# Patient Record
Sex: FEMALE | URBAN - METROPOLITAN AREA
[De-identification: names, ages, dates, MRNs, and addresses within clinical notes are randomized per-mention and may not be internally consistent; named-entity substitution may affect disease eponyms.]

---

## 2024-03-14 ENCOUNTER — APPOINTMENT (RX ONLY)
Dept: URBAN - METROPOLITAN AREA CLINIC 333 | Facility: CLINIC | Age: 29
Setting detail: DERMATOLOGY
End: 2024-03-14

## 2024-03-14 DIAGNOSIS — Z41.9 ENCOUNTER FOR PROCEDURE FOR PURPOSES OTHER THAN REMEDYING HEALTH STATE, UNSPECIFIED: ICD-10-CM

## 2024-03-14 PROCEDURE — ? VI PEEL

## 2024-03-14 NOTE — PROCEDURE: VI PEEL
Comments: Recommended to do a series of 3 peels to give the best benefits.\\nRecommended to continue to use sunscreen along with her fading cream after peeling has completely subsided.
Detail Level: Zone
Prep: The treated area was cleansed and degreased with pre-peel prep.
Post-Care Instructions: Day One: Do not put anything on your skin for 4 hours (including sunscreen). \\n 4 hours after VI Peel (At _____________), apply Post Treatment Repair (in the post peel kit) over the peeled areas. \\nApply the SPF 50 over the Post Treatment Repair.\\n\\nNight One: 1 hour before bedtime\\n    Step 1: Cleanse skin with half of the  Vi Derm Gentle Cleanser. Avoid hot water. Gently pat skin dry.\\n    Step 2: Apply Precision Plus Towelette (White Towelette) to all areas of the skin. Use firm pressure.  DO NOT WASH OFF\\n    Step 3: Wait 30 minutes then apply Post Peel Towelette (Green Towelette) to all areas of the skin. Use firm pressure. DO NOT WASH OFF\\n    Step 4: Wait 10 min and then apply a thin layer of the Post Treatment Repair. \\n    Step 5: You are now ready for bed. Sweet Dreams!\\n      \\nDay Two: \\n    Step 1: Cleanse your skin with the other half of the Vi Derm Gentle Cleanser. Avoid hot water. Gently pat skin dry.\\n    Step 2: Apply a thin layer of Post Treatment Repair. Reapply throughout the day. (late morning, lunch and mid evening)\\n    Step 3: Apply Vi Derm SPF 50+ sunscreen. Reapply every couple of hours. Even if it is not michael outside!                                                                                                                                                  \\n\\nNight Two: 1 hour before bedtime\\n    Step 1: Cleanse your skin with half of the Vi Derm Gentle cleanser. Avoid hot water. Gently pat skin dry.\\n    Step 2:  Apply Precision Peel Towelette (White Towelette) to all areas of skin. DO NOT WASH OFF\\n    Step 3: Wait 30 minutes, then apply the Post Peel Towelette (Green Towelette).  DO NOT WASH OFF\\n    Step 4: Wait 10 min and then apply a thin layer of the Post Treatment Repair. \\n    Step 5: You are now ready for bed. Sweet Dreams!\\n        \\nDay Three:\\n    Today is when the peeling phase starts usually around the mouth area peeling outwards.\\n     Step 1: Cleanse with half of the Vi Derm gentle cleanser.\\n     Step 2: Apply thin layer of post treatment repair at least 3 times per day , more often if needed.\\n     Step 3: Apply  your Vi Derm SPF 50 and try to avoid the sun during the peeling phase. Reapply every 2 hours.\\n\\nDay Four - Seven\\n     You will wash your face morning and night with a gentle cleanser.\\n     You will continue using the Post Treatment Repair until you run out or until you come back to see me for your next appointment.\\n     Remember to wear Vi Derm SPF50 every day and you can reapply with our SPF Brush.\\n     You can wear make-up throughout the peeling process if you desire.\\n     After all peeling is complete you may resume your normal skin care regimen.\\n\\nPlease note the following:\\n· Never peel or force the skin off, you will scar.  Your skin will naturally slough off when washed.  You may take small scissors and trim the peeling skin.  Please be careful when doing so.\\n· If at anytime the Post Treatment Repair or sunscreen or a combination of both irritate your skin stop using them immediately and call our office at [phone].
Price (Use Numbers Only, No Special Characters Or $): 389
Post Peel Care: After the procedure, the patient was instructed not to apply anything to the skin for 4 hours. Written post op instructions given to pt to follow throughout the healing process. Pt to use Post Tx Repair Cream 4 hours post procedure, then BID and PRN throughout the day. Pt counseled on importance of SPF reapplication. Pt to resume normal skin care regimen in 7-10 days.
Treatment Number: 1
Consent: Prior to the procedure, written consent was obtained and risks were reviewed, including but not limited to: redness, peeling, blistering, pigmentary change, scarring, infection, and pain. Patient is aware multiple treatments may be necessary to achieve the desired outcome.
Chemical Peel: VI Peel Precision Plus

## 2024-04-11 ENCOUNTER — APPOINTMENT (RX ONLY)
Dept: URBAN - METROPOLITAN AREA CLINIC 333 | Facility: CLINIC | Age: 29
Setting detail: DERMATOLOGY
End: 2024-04-11

## 2024-04-11 DIAGNOSIS — Z41.9 ENCOUNTER FOR PROCEDURE FOR PURPOSES OTHER THAN REMEDYING HEALTH STATE, UNSPECIFIED: ICD-10-CM

## 2024-04-11 PROCEDURE — ? VI PEEL

## 2024-04-11 NOTE — PROCEDURE: VI PEEL
Consent: Prior to the procedure, written consent was obtained and risks were reviewed, including but not limited to: redness, peeling, blistering, pigmentary change, scarring, infection, and pain. Patient is aware multiple treatments may be necessary to achieve the desired outcome.
Price (Use Numbers Only, No Special Characters Or $): 389
Post-Care Instructions: Day One: Do not put anything on your skin for 4 hours (including sunscreen). \\n 4 hours after VI Peel (At _____________), apply Post Treatment Repair (in the post peel kit) over the peeled areas. \\nApply the SPF 50 over the Post Treatment Repair.\\n\\nNight One: 1 hour before bedtime\\n    Step 1: Cleanse skin with half of the  Vi Derm Gentle Cleanser. Avoid hot water. Gently pat skin dry.\\n    Step 2: Apply Precision Plus Towelette (White Towelette) to all areas of the skin. Use firm pressure.  DO NOT WASH OFF\\n    Step 3: Wait 30 minutes then apply Post Peel Towelette (Green Towelette) to all areas of the skin. Use firm pressure. DO NOT WASH OFF\\n    Step 4: Wait 10 min and then apply a thin layer of the Post Treatment Repair. \\n    Step 5: You are now ready for bed. Sweet Dreams!\\n      \\nDay Two: \\n    Step 1: Cleanse your skin with the other half of the Vi Derm Gentle Cleanser. Avoid hot water. Gently pat skin dry.\\n    Step 2: Apply a thin layer of Post Treatment Repair. Reapply throughout the day. (late morning, lunch and mid evening)\\n    Step 3: Apply Vi Derm SPF 50+ sunscreen. Reapply every couple of hours. Even if it is not michael outside!                                                                                                                                                  \\n\\nNight Two: 1 hour before bedtime\\n    Step 1: Cleanse your skin with half of the Vi Derm Gentle cleanser. Avoid hot water. Gently pat skin dry.\\n    Step 2:  Apply Precision Peel Towelette (White Towelette) to all areas of skin. DO NOT WASH OFF\\n    Step 3: Wait 30 minutes, then apply the Post Peel Towelette (Green Towelette).  DO NOT WASH OFF\\n    Step 4: Wait 10 min and then apply a thin layer of the Post Treatment Repair. \\n    Step 5: You are now ready for bed. Sweet Dreams!\\n        \\nDay Three:\\n    Today is when the peeling phase starts usually around the mouth area peeling outwards.\\n     Step 1: Cleanse with half of the Vi Derm gentle cleanser.\\n     Step 2: Apply thin layer of post treatment repair at least 3 times per day , more often if needed.\\n     Step 3: Apply  your Vi Derm SPF 50 and try to avoid the sun during the peeling phase. Reapply every 2 hours.\\n\\nDay Four - Seven\\n     You will wash your face morning and night with a gentle cleanser.\\n     You will continue using the Post Treatment Repair until you run out or until you come back to see me for your next appointment.\\n     Remember to wear Vi Derm SPF50 every day and you can reapply with our SPF Brush.\\n     You can wear make-up throughout the peeling process if you desire.\\n     After all peeling is complete you may resume your normal skin care regimen.\\n\\nPlease note the following:\\n· Never peel or force the skin off, you will scar.  Your skin will naturally slough off when washed.  You may take small scissors and trim the peeling skin.  Please be careful when doing so.\\n· If at anytime the Post Treatment Repair or sunscreen or a combination of both irritate your skin stop using them immediately and call our office at [phone].
Treatment Number: 2
Detail Level: Zone
Post Peel Care: After the procedure, the patient was instructed not to apply anything to the skin for 4 hours. Written post op instructions given to pt to follow throughout the healing process. Pt to use Post Tx Repair Cream 4 hours post procedure, then BID and PRN throughout the day. Pt counseled on importance of SPF reapplication. Pt to resume normal skin care regimen in 7-10 days.
Prep: The treated area was cleansed and degreased with pre-peel prep.
Chemical Peel: VI Peel Precision Plus
Comments: 2/3

## 2024-05-29 ENCOUNTER — APPOINTMENT (RX ONLY)
Dept: URBAN - METROPOLITAN AREA CLINIC 333 | Facility: CLINIC | Age: 29
Setting detail: DERMATOLOGY
End: 2024-05-29

## 2024-05-29 DIAGNOSIS — Z41.9 ENCOUNTER FOR PROCEDURE FOR PURPOSES OTHER THAN REMEDYING HEALTH STATE, UNSPECIFIED: ICD-10-CM

## 2024-05-29 PROCEDURE — ? VI PEEL

## 2024-05-29 NOTE — PROCEDURE: VI PEEL
Post-Care Instructions: Day One: Do not put anything on your skin for 4 hours (including sunscreen). \\n 4 hours after VI Peel (At _____________), apply Post Treatment Repair (in the post peel kit) over the peeled areas. \\nApply the SPF 50 over the Post Treatment Repair.\\n\\nNight One: 1 hour before bedtime\\n    Step 1: Cleanse skin with half of the  Vi Derm Gentle Cleanser. Avoid hot water. Gently pat skin dry.\\n    Step 2: Apply Precision Plus Towelette (White Towelette) to all areas of the skin. Use firm pressure.  DO NOT WASH OFF\\n    Step 3: Wait 30 minutes then apply Post Peel Towelette (Green Towelette) to all areas of the skin. Use firm pressure. DO NOT WASH OFF\\n    Step 4: Wait 10 min and then apply a thin layer of the Post Treatment Repair. \\n    Step 5: You are now ready for bed. Sweet Dreams!\\n      \\nDay Two: \\n    Step 1: Cleanse your skin with the other half of the Vi Derm Gentle Cleanser. Avoid hot water. Gently pat skin dry.\\n    Step 2: Apply a thin layer of Post Treatment Repair. Reapply throughout the day. (late morning, lunch and mid evening)\\n    Step 3: Apply Vi Derm SPF 50+ sunscreen. Reapply every couple of hours. Even if it is not michael outside!                                                                                                                                                  \\n\\nNight Two: 1 hour before bedtime\\n    Step 1: Cleanse your skin with half of the Vi Derm Gentle cleanser. Avoid hot water. Gently pat skin dry.\\n    Step 2:  Apply Precision Peel Towelette (White Towelette) to all areas of skin. DO NOT WASH OFF\\n    Step 3: Wait 30 minutes, then apply the Post Peel Towelette (Green Towelette).  DO NOT WASH OFF\\n    Step 4: Wait 10 min and then apply a thin layer of the Post Treatment Repair. \\n    Step 5: You are now ready for bed. Sweet Dreams!\\n        \\nDay Three:\\n    Today is when the peeling phase starts usually around the mouth area peeling outwards.\\n     Step 1: Cleanse with half of the Vi Derm gentle cleanser.\\n     Step 2: Apply thin layer of post treatment repair at least 3 times per day , more often if needed.\\n     Step 3: Apply  your Vi Derm SPF 50 and try to avoid the sun during the peeling phase. Reapply every 2 hours.\\n\\nDay Four - Seven\\n     You will wash your face morning and night with a gentle cleanser.\\n     You will continue using the Post Treatment Repair until you run out or until you come back to see me for your next appointment.\\n     Remember to wear Vi Derm SPF50 every day and you can reapply with our SPF Brush.\\n     You can wear make-up throughout the peeling process if you desire.\\n     After all peeling is complete you may resume your normal skin care regimen.\\n\\nPlease note the following:\\n· Never peel or force the skin off, you will scar.  Your skin will naturally slough off when washed.  You may take small scissors and trim the peeling skin.  Please be careful when doing so.\\n· If at anytime the Post Treatment Repair or sunscreen or a combination of both irritate your skin stop using them immediately and call our office at [phone].
Price (Use Numbers Only, No Special Characters Or $): 389
Chemical Peel: VI Peel Precision Plus
Prep: The treated area was cleansed and degreased with pre-peel prep.
Treatment Number: 3
Detail Level: Zone
Post Peel Care: After the procedure, the patient was instructed not to apply anything to the skin for 4 hours. Written post op instructions given to pt to follow throughout the healing process. Pt to use Post Tx Repair Cream 4 hours post procedure, then BID and PRN throughout the day. Pt counseled on importance of SPF reapplication. Pt to resume normal skin care regimen in 7-10 days.
Consent: Prior to the procedure, written consent was obtained and risks were reviewed, including but not limited to: redness, peeling, blistering, pigmentary change, scarring, infection, and pain. Patient is aware multiple treatments may be necessary to achieve the desired outcome.
Comments: 3/3\\nRecommended to come in 30 days to do a picture and HydraFacial appointment.